# Patient Record
Sex: FEMALE | Race: BLACK OR AFRICAN AMERICAN | NOT HISPANIC OR LATINO | URBAN - METROPOLITAN AREA
[De-identification: names, ages, dates, MRNs, and addresses within clinical notes are randomized per-mention and may not be internally consistent; named-entity substitution may affect disease eponyms.]

---

## 2024-02-08 ENCOUNTER — OV CON (OUTPATIENT)
Dept: URBAN - METROPOLITAN AREA CLINIC 40 | Facility: CLINIC | Age: 74
End: 2024-02-08

## 2024-02-08 RX ORDER — CITALOPRAM 40 MG/1
TABLET, FILM COATED ORAL
Qty: 90 TABLET | Status: ACTIVE | COMMUNITY

## 2024-02-08 RX ORDER — METFORMIN ER 500 MG 500 MG/1
TABLET ORAL
Qty: 90 TABLET | Status: ACTIVE | COMMUNITY

## 2024-02-08 RX ORDER — ATORVASTATIN CALCIUM 20 MG/1
TABLET ORAL
Qty: 90 TABLET | Status: ACTIVE | COMMUNITY

## 2024-02-08 RX ORDER — LOSARTAN POTASSIUM 100 MG/1
TAKE 1 TABLET BY MOUTH DAILY TABLET, FILM COATED ORAL
Qty: 90 EACH | Refills: 0 | Status: ACTIVE | COMMUNITY

## 2024-02-08 RX ORDER — KETOCONAZOLE 20 MG/G
CREAM TOPICAL
Qty: 60 GRAM | Status: ACTIVE | COMMUNITY

## 2024-02-08 RX ORDER — CITALOPRAM 40 MG/1
TAKE 1 TABLET BY MOUTH EVERY DAY TABLET, FILM COATED ORAL
Qty: 90 EACH | Refills: 0 | Status: ACTIVE | COMMUNITY

## 2024-02-08 RX ORDER — ATORVASTATIN CALCIUM 20 MG/1
TAKE 1 TABLET BY MOUTH AT BEDTIME TABLET ORAL
Qty: 90 EACH | Refills: 0 | Status: ACTIVE | COMMUNITY

## 2024-02-08 RX ORDER — FLUTICASONE PROPIONATE 50 MCG
SHAKE LIQUID AND USE 1 SPRAY IN EACH NOSTRIL EVERY DAY SPRAY, SUSPENSION (ML) NASAL
Qty: 15.8 MILLILITER | Refills: 0 | Status: ACTIVE | COMMUNITY

## 2024-02-08 RX ORDER — VERAPAMIL HYDROCHLORIDE 180 MG/1
TAKE 1 TABLET BY MOUTH DAILY TABLET, FILM COATED, EXTENDED RELEASE ORAL
Qty: 90 EACH | Refills: 0 | Status: ACTIVE | COMMUNITY

## 2024-02-08 RX ORDER — ATORVASTATIN CALCIUM 20 MG/1
TABLET ORAL
Qty: 90 TABLET | Status: ON HOLD | COMMUNITY

## 2024-02-12 ENCOUNTER — LAB (OUTPATIENT)
Dept: URBAN - METROPOLITAN AREA CLINIC 40 | Facility: CLINIC | Age: 74
End: 2024-02-12

## 2024-02-12 ENCOUNTER — OV NP (OUTPATIENT)
Dept: URBAN - METROPOLITAN AREA CLINIC 40 | Facility: CLINIC | Age: 74
End: 2024-02-12
Payer: MEDICARE

## 2024-02-12 VITALS
HEIGHT: 66 IN | BODY MASS INDEX: 38.09 KG/M2 | TEMPERATURE: 97.5 F | WEIGHT: 237 LBS | HEART RATE: 80 BPM | DIASTOLIC BLOOD PRESSURE: 88 MMHG | SYSTOLIC BLOOD PRESSURE: 142 MMHG

## 2024-02-12 DIAGNOSIS — Z86.010 PERSONAL HISTORY OF COLON POLYPS: ICD-10-CM

## 2024-02-12 PROCEDURE — 99202 OFFICE O/P NEW SF 15 MIN: CPT | Performed by: PHYSICIAN ASSISTANT

## 2024-02-12 RX ORDER — ATORVASTATIN CALCIUM 20 MG/1
TAKE 1 TABLET BY MOUTH AT BEDTIME TABLET ORAL
Qty: 90 EACH | Refills: 0 | Status: ACTIVE | COMMUNITY

## 2024-02-12 RX ORDER — ATORVASTATIN CALCIUM 20 MG/1
TABLET ORAL
Qty: 90 TABLET | Status: ON HOLD | COMMUNITY

## 2024-02-12 RX ORDER — KETOCONAZOLE 20 MG/G
CREAM TOPICAL
Qty: 60 GRAM | Status: ACTIVE | COMMUNITY

## 2024-02-12 RX ORDER — CITALOPRAM 40 MG/1
TAKE 1 TABLET BY MOUTH EVERY DAY TABLET, FILM COATED ORAL
Qty: 90 EACH | Refills: 0 | Status: ACTIVE | COMMUNITY

## 2024-02-12 RX ORDER — CITALOPRAM 40 MG/1
TABLET, FILM COATED ORAL
Qty: 90 TABLET | Status: ACTIVE | COMMUNITY

## 2024-02-12 RX ORDER — FLUTICASONE PROPIONATE 50 MCG
SHAKE LIQUID AND USE 1 SPRAY IN EACH NOSTRIL EVERY DAY SPRAY, SUSPENSION (ML) NASAL
Qty: 15.8 MILLILITER | Refills: 0 | Status: ACTIVE | COMMUNITY

## 2024-02-12 RX ORDER — VERAPAMIL HYDROCHLORIDE 180 MG/1
TAKE 1 TABLET BY MOUTH DAILY TABLET, FILM COATED, EXTENDED RELEASE ORAL
Qty: 90 EACH | Refills: 0 | Status: ACTIVE | COMMUNITY

## 2024-02-12 RX ORDER — CHOLECALCIFEROL (VITAMIN D3) 125 MCG
1 TABLET TABLET ORAL ONCE A DAY
Status: ACTIVE | COMMUNITY

## 2024-02-12 RX ORDER — ASCORBIC ACID 1000 MG
1 TABLET TABLET ORAL ONCE A DAY
Status: ACTIVE | COMMUNITY

## 2024-02-12 RX ORDER — LOSARTAN POTASSIUM 100 MG/1
TAKE 1 TABLET BY MOUTH DAILY TABLET, FILM COATED ORAL
Qty: 90 EACH | Refills: 0 | Status: ACTIVE | COMMUNITY

## 2024-02-12 RX ORDER — ATORVASTATIN CALCIUM 20 MG/1
TABLET ORAL
Qty: 90 TABLET | Status: ACTIVE | COMMUNITY

## 2024-02-12 RX ORDER — METFORMIN ER 500 MG 500 MG/1
TABLET ORAL
Qty: 90 TABLET | Status: ACTIVE | COMMUNITY

## 2024-02-12 NOTE — HPI-TODAY'S VISIT:
Ms. Chase is a 73-year-old black female presents to the office today to schedule screening colonoscopy. She has also been with complaint of right upper quadrant abdominal pain for the last 4 months.  Admits that pain has been intermittent.  I do see patient recently had a complete abdominal ultrasound with results pending. Reportedly, a screening colonoscopy >10 years ago where benign polyps may have been removed. There is no FHx given of CRC. She has history of HTN, Obesity, T2DM. She denies any significant alcohol intake, NSAID use or tobacco use.  Admits that she does have some right-sided back pain as well but believes it is her diaphragm that is causing intermittent right-sided discomfort that tends to worsen with movement versus eating.  No nausea, vomiting or dysphagia.  Good appetite and weight stable.  She is trying to eat healthier and become more active for weight loss, on phentermine.